# Patient Record
Sex: MALE | Race: WHITE | NOT HISPANIC OR LATINO | Employment: OTHER | URBAN - METROPOLITAN AREA
[De-identification: names, ages, dates, MRNs, and addresses within clinical notes are randomized per-mention and may not be internally consistent; named-entity substitution may affect disease eponyms.]

---

## 2017-03-29 ENCOUNTER — TRANSCRIBE ORDERS (OUTPATIENT)
Dept: MRI IMAGING | Facility: CLINIC | Age: 77
End: 2017-03-29

## 2017-03-29 ENCOUNTER — HOSPITAL ENCOUNTER (OUTPATIENT)
Dept: RADIOLOGY | Facility: CLINIC | Age: 77
Discharge: HOME | End: 2017-03-29
Payer: COMMERCIAL

## 2017-03-29 DIAGNOSIS — R91.8 LUNG MASS: ICD-10-CM

## 2017-03-29 DIAGNOSIS — R91.8 LUNG MASS: Primary | ICD-10-CM

## 2017-03-29 PROCEDURE — 71020 HB CHEST X-RAY 2VW FRONTAL&LATL: CPT

## 2017-05-08 ENCOUNTER — ALLSCRIPTS OFFICE VISIT (OUTPATIENT)
Dept: OTHER | Facility: OTHER | Age: 77
End: 2017-05-08

## 2017-05-08 ENCOUNTER — TRANSCRIBE ORDERS (OUTPATIENT)
Dept: ADMINISTRATIVE | Facility: HOSPITAL | Age: 77
End: 2017-05-08

## 2017-05-08 DIAGNOSIS — E13.42 OTHER SPECIFIED DIABETES MELLITUS WITH DIABETIC POLYNEUROPATHY (HCC): ICD-10-CM

## 2017-05-08 DIAGNOSIS — G45.9 TRANSIENT CEREBRAL ISCHEMIC ATTACK: ICD-10-CM

## 2017-05-08 DIAGNOSIS — G45.9 UNSPECIFIED TRANSIENT CEREBRAL ISCHEMIA: Primary | ICD-10-CM

## 2017-05-22 ENCOUNTER — HOSPITAL ENCOUNTER (OUTPATIENT)
Dept: MRI IMAGING | Facility: CLINIC | Age: 77
Discharge: HOME | End: 2017-05-22
Payer: COMMERCIAL

## 2017-05-22 ENCOUNTER — HOSPITAL ENCOUNTER (OUTPATIENT)
Dept: ULTRASOUND IMAGING | Facility: CLINIC | Age: 77
Discharge: HOME | End: 2017-05-22
Payer: COMMERCIAL

## 2017-05-22 DIAGNOSIS — G45.9 TRANSIENT CEREBRAL ISCHEMIC ATTACK: ICD-10-CM

## 2017-05-22 PROCEDURE — 93880 EXTRACRANIAL BILAT STUDY: CPT

## 2017-05-22 PROCEDURE — 70551 MRI BRAIN STEM W/O DYE: CPT

## 2017-05-31 ENCOUNTER — TRANSCRIBE ORDERS (OUTPATIENT)
Dept: MRI IMAGING | Facility: CLINIC | Age: 77
End: 2017-05-31

## 2017-05-31 DIAGNOSIS — R91.1 COIN LESION: Primary | ICD-10-CM

## 2017-06-01 ENCOUNTER — GENERIC CONVERSION - ENCOUNTER (OUTPATIENT)
Dept: OTHER | Facility: OTHER | Age: 77
End: 2017-06-01

## 2017-06-09 ENCOUNTER — HOSPITAL ENCOUNTER (OUTPATIENT)
Dept: CT IMAGING | Facility: CLINIC | Age: 77
Discharge: HOME | End: 2017-06-09
Payer: COMMERCIAL

## 2017-06-09 DIAGNOSIS — R91.1 COIN LESION: ICD-10-CM

## 2017-06-09 PROCEDURE — 71250 CT THORAX DX C-: CPT

## 2017-06-22 ENCOUNTER — ALLSCRIPTS OFFICE VISIT (OUTPATIENT)
Dept: OTHER | Facility: OTHER | Age: 77
End: 2017-06-22

## 2017-08-01 ENCOUNTER — ALLSCRIPTS OFFICE VISIT (OUTPATIENT)
Dept: OTHER | Facility: OTHER | Age: 77
End: 2017-08-01

## 2017-08-01 ENCOUNTER — HOSPITAL ENCOUNTER (OUTPATIENT)
Dept: MRI IMAGING | Facility: CLINIC | Age: 77
Discharge: HOME | End: 2017-08-01
Payer: COMMERCIAL

## 2017-08-01 DIAGNOSIS — F05 DELIRIUM DUE TO KNOWN PHYSIOLOGICAL CONDITION: ICD-10-CM

## 2017-08-01 PROCEDURE — 70551 MRI BRAIN STEM W/O DYE: CPT

## 2017-08-02 ENCOUNTER — TRANSCRIBE ORDERS (OUTPATIENT)
Dept: ADMINISTRATIVE | Facility: HOSPITAL | Age: 77
End: 2017-08-02

## 2017-08-02 DIAGNOSIS — F05 DELIRIUM DUE TO CONDITIONS CLASSIFIED ELSEWHERE: Primary | ICD-10-CM

## 2017-08-24 ENCOUNTER — ALLSCRIPTS OFFICE VISIT (OUTPATIENT)
Dept: OTHER | Facility: OTHER | Age: 77
End: 2017-08-24

## 2017-10-24 NOTE — PROGRESS NOTES
Assessment  1  Cerebrovascular accident (CVA) due to occlusion of left middle cerebral artery (434 91)   (T44 030)   2  Polyneuropathy due to secondary diabetes (249 60,357 2) (E13 42)   3  Memory difficulty (780 93) (R41 3)    Plan  Cerebrovascular accident (CVA) due to occlusion of left middle cerebral artery, Memory  difficulty, Polyneuropathy due to secondary diabetes    · Follow-up visit in 2 months Evaluation and Treatment  Follow-up  Status: Hold For -  Scheduling  Requested for: 12Fcc2630   Ordered; For: Cerebrovascular accident (CVA) due to occlusion of left middle cerebral artery, Memory difficulty, Polyneuropathy due to secondary diabetes; Ordered By: Jhon Lin Performed:  Due: 18EQV9051  Memory difficulty    · Donepezil HCl - 10 MG Oral Tablet (Aricept); TAKE 1 TABLET AT BEDTIME   Rx By: Jhon Lin; Dispense: 0 Days ; #:30 Tablet; Refill: 5;For: Memory difficulty; ALEXY = N; Sent To: [facility]   · Donepezil HCl - 5 MG Oral Tablet (Aricept); TAKE 1 TABLET AT BEDTIME   Rx By: Jhon Lin; Dispense: 28 Days ; #:28 Tablet; Refill: 0;For: Memory difficulty; ALEXY = N; Sent To: [facility]    Discussion/Summary  Discussion Summary:   Herbie Cranker suffered an acute thalamic CVA  He had had a stroke earlier this summer felt secondary to problems controlling his Coumadin and Eliquis was substituted but at that time his aspirin was discontinued  He will continue with both Eliquis and aspirin  He is having more cognitive difficulties which may be related to multi-infarct dementia  Have recommended initiating Aricept 5 mg daily for 4 weeks then increase to 10 mg  We discussed the potential adverse effects and they will notify me if he has any problem  Chief Complaint  Chief Complaint Free Text Note Form: Patient returns in follow up for Acute confusional state  Patient states no EEG was done because an MRI of brain was ordered        History of Present Illness  HPI: Herbie Cranker returns in follow-up today  In my absence earlier this month he was seen by my associate Dr Faiza Arias following a confusional episode that occurred the day before  An MRI of the brain was performed which demonstrated acute left thalamic stroke  The next day he was admitted to [address] [place] [address] because of the stroke  He had a carotid ultrasound performed which failed to demonstrate any significant abnormalities and he was released on eloquent was for his atrial fibrillation as well as an 81 mg aspirin  He has been taking these medications since discharge and tolerates them well  He still notes a little bit of weakness on the left side and notes fatigue since the stroke  His wife reports that he seems to be a bit more forgetful  He forgets things that happened in the recent past but is able to remember things from long ago  He gets confused with his TV remote which she used to be able to operate without difficulty  Review of Systems  Neurological ROS:   Constitutional: no fever, no chills, no recent weight gain, no recent weight loss, no complaints of feeling tired, no changes in appetite  HEENT: sinus problems-- and-- feeling congested  Cardiovascular:  no chest pain or pressure, no palpitations present, the heart rate was not rapid or irregular, no swelling in the arms or legs, no poor circulation  Respiratory:  no unusual or persistant cough, no shortness of breath with or without exertion  Gastrointestinal:  no nausea, no vomiting, no diarrhea, no abdominal pain, no changes in bowel habits, no melena, no loss of bowel control  Genitourinary: incontinence  Musculoskeletal:  no arthralgias, no myalgias, no immobility or loss of function, no head/neck/back pain, no pain while walking  Integumentary  no masses, no rash, no skin lesions, no livedo reticularis  Psychiatric: depression  Endocrine   no unusual weight loss or gain, no excessive urination, no excessive thirst, no hair loss or gain, no hot or cold intolerance, no menstrual period change or irregularity, no loss of sexual ability or drive, no erection difficulty, no nipple discharge  Hematologic/Lymphatic:  no unusual bleeding, no tendency for easy bruising, no clotting skin or lumps  Neurological General:  no headache, no nausea or vomiting, no lightheadedness, no convulsions, no blackouts, no syncope, no trauma, no photopsia, no increased sleepiness, no trouble falling asleep, no snoring, no awakening at night  Neurological Mental Status: difficulty expressing/understanding speech-- and-- memory problems  Neurological Cranial Nerves:  no blurry or double vision, no loss of vision, no face drooping, no facial numbness or weakness, no taste or smell loss/changes, no hearing loss or ringing, no vertigo or dizziness, no dysphagia, no slurred speech  Neurological Motor findings include:  no tremor, no twitching, no cramping(pre/post exercise), no atrophy  Neurological Coordination: unsteadiness  Neurological Sensory:  no numbness, no pain, no tingling, does not fall when eyes closed or taking a shower  Neurological Gait:  no difficulty walking, not falling to one side, no sensation of being pushed, has not had falls  ROS Reviewed:   ROS reviewed  Active Problems  1  Acute confusional state (293 0) (F05)   2  Asymptomatic carotid artery stenosis (433 10) (I65 29)   3  Benign essential hypertension (401 1) (I10)   4  Benign prostatic hypertrophy (600 00) (N40 0)   5  Cerebrovascular accident (CVA) due to embolism of right posterior cerebral artery   (434 11) (I63 431)   6  Cerebrovascular accident (CVA) due to occlusion of left middle cerebral artery (434 91)   (I63 512)   7  Diabetes Mellitus (250 00)   8  Glaucoma (increased eye pressure) (365 9) (H40 9)   9  History of myocardial infarction (412) (I25 2)   10  Hyperlipidemia (272 4) (E78 5)   11  Inflamed seborrheic keratosis (702 11) (L82 0)   12   Polyneuropathy due to secondary diabetes (249 60,357 2) (E13 42)   13  Screening for skin condition (V82 0) (Z13 89)   14  Seborrheic dermatitis (690 10) (L21 9)   15  Seborrheic keratosis (702 19) (L82 1)   16  TIA (transient ischemic attack) (435 9) (G45 9)   17  Vitamin B12 deficiency (266 2) (E53 8)    Past Medical History  1  History of transient cerebral ischemia (V12 54) (A02 52)  Active Problems And Past Medical History Reviewed: The active problems and past medical history were reviewed and updated today  Surgical History  1  History of Carotid Thromboendarterectomy   2  History of Prostate Surgery  Surgical History Reviewed: The surgical history was reviewed and updated today  Family History  Mother    1  No pertinent family history  Son    2  Family history of diabetes mellitus (V18 0) (Z83 3)  Family History Reviewed: The family history was reviewed and updated today  Social History   · Former smoker (E78 77) (R78 439)   ·    · No alcohol use   · No drug use   · Retired  Social History Reviewed: The social history was reviewed and updated today  Current Meds   1  Aspirin EC 81 MG Oral Tablet Delayed Release; TAKE 1 TABLET DAILY; Therapy: 01Aug2017 to (Evaluate:30Oct2017); Last Rx:01Aug2017 Ordered   2  Atorvastatin Calcium 20 MG Oral Tablet; TAKE 1 TABLET DAILY; Therapy: (Ana Laura Sullivan) to Recorded   3  CoQ-10 100 MG Oral Capsule; take 1 capsule daily; Therapy: (Ana Laura Sullivan) to Recorded   4  Cyanocobalamin 100 MCG/ML SOLN; once every month; Therapy: (Ana Laura Sullivan) to Recorded   5  Digoxin 125 MCG Oral Tablet; TAKE 1 TABLET DAILY; Therapy: (Ana Laura Sullivan) to Recorded   6  Doxazosin Mesylate 4 MG Oral Tablet; Take 1 tablet twice daily; Therapy: (Ana Laura Sullivan) to Recorded   7  Eliquis 5 MG Oral Tablet; Take 1 tablet twice daily; Therapy: (Ana Laura Sullivan) to Recorded   8  Gabapentin 300 MG Oral Capsule; take 1 capsule daily;    Therapy: (Ana Laura Sullivan) to Recorded   9  Gemfibrozil 600 MG Oral Tablet; TAKE 1 TABLET TWICE DAILY; Therapy: (Cayla Linder) to Recorded   10  GlipiZIDE XL 2 5 MG Oral Tablet Extended Release 24 Hour; TAKE 2 TABLET Every    morning 1 tab in pm;    Therapy: (Recorded:22Jun2017) to Recorded   11  Isosorbide Mononitrate 30 MG TB24; TAKE 1 TABLET DAILY; Therapy: (Cayla Linder) to Recorded   12  Ketoconazole 2 % External Shampoo; SHAMPOO HAIR/SCALP TWICE WEEKLY; Therapy: [id number] to (Last Rx:08Auo9559)  Requested for: 69EVF1241 Ordered   13  Lisinopril 40 MG Oral Tablet; TAKE 1 TABLET DAILY; Therapy: (Cayla Linder) to Recorded   14  Matzim  MG Oral Tablet Extended Release 24 Hour; TAKE 1 TABLET DAILY; Therapy: (Cayla Linder) to Recorded   15  MetFORMIN HCl - 500 MG Oral Tablet; TAKE 1 TABLET TWICE DAILY; Therapy: (Cayla Linder) to Recorded   16  Metoprolol Succinate ER 50 MG Oral Tablet Extended Release 24 Hour; TAKE TABLET  1    tab twice as directed; Therapy: (Cayla Linder) to Recorded   17  MiraLax Oral Packet; TAKE 1 PACKET Daily PRN; Therapy: (Dona Min) to Recorded   18  Timolol Maleate 0 5 % Ophthalmic Solution; APPLY 1 DROP Every morning both eyes; Therapy: (Cayla Linder) to Recorded  Medication List Reviewed: The medication list was reviewed and updated today  Allergies  1  No Known Drug Allergies  2  No Known Environmental Allergies   3  No Known Food Allergies    Vitals  Signs   Recorded: 28Nmn1016 12:47PM   Heart Rate: 48  Systolic: 447, LUE, Sitting  Diastolic: 62, LUE, Sitting  Height: 5 ft 6 in  Weight: 138 lb 2 oz  BMI Calculated: 22 29  BSA Calculated: 1 71  Pain Scale: 0    Physical Exam    Constitutional   General appearance: No acute distress, well appearing and well nourished  HEENT/NECK: Head is atraumatic normocephalic, neck is supple  NEUROLOGIC:  Mental Status: Awake and alert without aphasia   He is oriented 4/5 (could not tell me the correct date)  Was able to recall 3 of 3 objects immediately and 0 of 3 objects after a few minutes  No evidence of aphasia, mild dysarthria is noted  Cranial Nerves: Extraocular movements are full  Face is symmetrical to light touch and movements  Motor: No drift is noted on arm extension  Coordination: Finger to nose testing is performed accurately  Romberg reveals increased sway with eyes closed  Gait reveals a mild increased base with decreased arm swing right greater than left    Reflexes: Hypoactive throughout      Results/Data  Results of MRI performed here as well as CAT scan and carotid ultrasound as well as [facility] discharge summary were reviewed        Future Appointments    Date/Time Provider Specialty Site   12/26/2017 12:40 PM Valentino Silos, MD Neurology [facility] [address]   Electronically signed by : Rodriguez Cid MD; Aug 24 2017  1:25PM EST                       (Author)

## 2017-11-22 DIAGNOSIS — I65.29 OCCLUSION AND STENOSIS OF UNSPECIFIED CAROTID ARTERY: ICD-10-CM

## 2017-11-22 DIAGNOSIS — I63.512 CEREBRAL INFARCTION DUE TO UNSPECIFIED OCCLUSION OR STENOSIS OF LEFT MIDDLE CEREBRAL ARTERY (HCC): ICD-10-CM

## 2017-12-26 ENCOUNTER — ALLSCRIPTS OFFICE VISIT (OUTPATIENT)
Dept: OTHER | Facility: OTHER | Age: 77
End: 2017-12-26

## 2017-12-27 NOTE — PROGRESS NOTES
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 (249 60,357 2) (E13 42)   3  Memory difficulty (780 93) (R41 3)    Plan   Cerebrovascular accident (CVA) due to occlusion of left middle cerebral artery, Memory    difficulty, Polyneuropathy due to secondary diabetes    · Follow-up visit in 2 months Evaluation and Treatment  Follow-up  Status: Hold For -    Scheduling  Requested for: 18ZTD8811   Ordered; For: Cerebrovascular accident (CVA) due to occlusion of left middle cerebral artery, Memory difficulty, Polyneuropathy due to secondary diabetes; Ordered By: Sofie Crespo Performed:  Due: 34QEX0704    Discussion/Summary   Discussion Summary:    He will restart the Aricept taking it in the morning  If he has no problems with this he will increase the dose to 10 mg  If he does have issues he will notify me  Chief Complaint   Chief Complaint Free Text Note Form: Patient present for CVA, Polyneuropathy, and memory difficulty  History of Present Illness   HPI: Valerie Bone reports that he took the Aricept for about a week and he felt that it was keeping him up at night so he stopped taking it  He did not notify me  He denied any vivid dreams or other adverse effects from the medicine  His wife reports that she does not feel that his memory has gotten worse since here last  He denies any new symptoms of stroke and denies any symptoms of neuropathic pain in his feet      Review of Systems   Neurological ROS:      Constitutional: no fever, no chills, no recent weight gain, no recent weight loss, no complaints of feeling tired, no changes in appetite  HEENT:  no sinus problems, not feeling congested, no blurred vision, no dryness of the eyes, no eye pain, no hearing loss, no tinnitus, no mouth sores, no sore throat, no hoarseness, no dysphagia, no masses, no bleeding        Cardiovascular:  no chest pain or pressure, no palpitations present, the heart rate was not rapid or irregular, no swelling in the arms or legs, no poor circulation  Respiratory:  no unusual or persistant cough, no shortness of breath with or without exertion  Gastrointestinal:  no nausea, no vomiting, no diarrhea, no abdominal pain, no changes in bowel habits, no melena, no loss of bowel control  Genitourinary:  no incontinence, no feelings of urinary urgency, no increase in frequency, no urinary hesitancy, no dysuria, no hematuria  Musculoskeletal: arthralgias  Integumentary  no masses, no rash, no skin lesions, no livedo reticularis  Psychiatric: mood swings  Endocrine loss of sexual ability or drive   Hematologic/Lymphatic:  no unusual bleeding, no tendency for easy bruising, no clotting skin or lumps  Neurological General: trouble falling asleep-- and-- waking up at night  Neurological Mental Status: memory problems  Neurological Cranial Nerves:  no blurry or double vision, no loss of vision, no face drooping, no facial numbness or weakness, no taste or smell loss/changes, no hearing loss or ringing, no vertigo or dizziness, no dysphagia, no slurred speech  Neurological Motor findings include:  no tremor, no twitching, no cramping(pre/post exercise), no atrophy  Neurological Coordination: balance difficulties  Neurological Sensory:  no numbness, no pain, no tingling, does not fall when eyes closed or taking a shower  Neurological Gait: difficulty walking-- and-- has had falls  ROS Reviewed:    ROS reviewed  Active Problems   1  Acute confusional state (293 0) (F05)   2  Asymptomatic carotid artery stenosis (433 10) (I65 29)   3  Benign essential hypertension (401 1) (I10)   4  Benign prostatic hypertrophy (600 00) (N40 0)   5  Cerebrovascular accident (CVA) due to embolism of right posterior cerebral artery     (434 11) (I63 431)   6   Cerebrovascular accident (CVA) due to occlusion of left middle cerebral artery (434 91)     (I63 512)   7  Diabetes Mellitus (250 00)   8  Glaucoma (increased eye pressure) (365 9) (H40 9)   9  History of myocardial infarction (412) (I25 2)   10  Hyperlipidemia (272 4) (E78 5)   11  Inflamed seborrheic keratosis (702 11) (L82 0)   12  Memory difficulty (780 93) (R41 3)   13  Polyneuropathy due to secondary diabetes (249 60,357 2) (E13 42)   14  Screening for skin condition (V82 0) (Z13 89)   15  Seborrheic dermatitis (690 10) (L21 9)   16  Seborrheic keratosis (702 19) (L82 1)   17  TIA (transient ischemic attack) (435 9) (G45 9)   18  Vitamin B12 deficiency (266 2) (E53 8)    Past Medical History   1  History of transient cerebral ischemia (V12 54) (T77 18)  Active Problems And Past Medical History Reviewed: The active problems and past medical history were reviewed and updated today  Surgical History   1  History of Carotid Thromboendarterectomy   2  History of Iridectomy Sector, For Glaucoma   3  History of Prostate Surgery  Surgical History Reviewed: The surgical history was reviewed and updated today  Family History   Mother    1  No pertinent family history  Son    2  Family history of diabetes mellitus (V18 0) (Z83 3)  Family History Reviewed: The family history was reviewed and updated today  Social History    · Former smoker (J26 83) (G34 798)   ·    · No alcohol use   · No drug use   · Retired  Social History Reviewed: The social history was reviewed and updated today  Current Meds    1  Aspirin EC 81 MG Oral Tablet Delayed Release; TAKE 1 TABLET DAILY; Therapy: 01Aug2017 to (Evaluate:30Oct2017); Last Rx:01Aug2017 Ordered   2  Atorvastatin Calcium 20 MG Oral Tablet; TAKE 1 TABLET DAILY; Therapy: (Betina Luciano) to Recorded   3  CoQ-10 100 MG Oral Capsule; take 1 capsule daily; Therapy: (Betina Luciano) to Recorded   4   Cyanocobalamin 100 MCG/ML SOLN; once every month; Therapy: (Fang Lopez) to Recorded   5  Digoxin 125 MCG Oral Tablet; TAKE 1 TABLET DAILY; Therapy: (Fang Lopez) to Recorded   6  Donepezil HCl - 5 MG Oral Tablet; TAKE 1 TABLET AT BEDTIME; Therapy: 35Wot0898 to (Evaluate:80Tsd6070)  Requested for: 00Fhn0361; Last     Rx:09Gry4052 Ordered   7  Doxazosin Mesylate 4 MG Oral Tablet; Take 1 tablet twice daily; Therapy: (Fang Lopez) to Recorded   8  Eliquis 5 MG Oral Tablet; Take 1 tablet twice daily; Therapy: (Fang Lopez) to Recorded   9  Gabapentin 300 MG Oral Capsule; take 1 capsule daily; Therapy: (Fang Lopez) to Recorded   10  Gemfibrozil 600 MG Oral Tablet; TAKE 1 TABLET TWICE DAILY; Therapy: (Fang Lopez) to Recorded   11  GlipiZIDE XL 2 5 MG Oral Tablet Extended Release 24 Hour; TAKE 2 TABLET Every      morning 1 tab in pm;      Therapy: (Recorded:22Jun2017) to Recorded   12  Isosorbide Mononitrate 30 MG TB24; TAKE 1 TABLET DAILY; Therapy: (Fang Lopez) to Recorded   13  Ketoconazole 2 % External Shampoo; SHAMPOO HAIR AND SCALP TWICE WEEKLY; Therapy: 65OVQ4459 to (Evaluate:13Mar2018)  Requested for: 35AZI3128; Last      Rx:13Nov2017 Ordered   14  Lisinopril 40 MG Oral Tablet; TAKE 1 TABLET DAILY; Therapy: (Fang Lopez) to Recorded   15  Matzim  MG Oral Tablet Extended Release 24 Hour; TAKE 1 TABLET DAILY; Therapy: (Fang Lopez) to Recorded   16  MetFORMIN HCl - 500 MG Oral Tablet; TAKE 1 TABLET TWICE DAILY; Therapy: (Fang Lopez) to Recorded   17  Metoprolol Succinate ER 50 MG Oral Tablet Extended Release 24 Hour; TAKE TABLET  1      tab twice as directed; Therapy: (Fang Lopez) to Recorded   18  MiraLax Oral Packet; TAKE 1 PACKET Daily PRN; Therapy: (Luis E Adam) to Recorded   19  Timolol Maleate 0 5 % Ophthalmic Solution; APPLY 1 DROP Every morning both eyes;       Therapy: (Fang Lopez) to Recorded  Medication List Reviewed: The medication list was reviewed and updated today  Allergies   1  No Known Drug Allergies  2  No Known Environmental Allergies   3  No Known Food Allergies    Vitals   Signs   Recorded: 59MFC6697 12:01PM   Heart Rate: 62  Systolic: 680  Diastolic: 76  Height: 5 ft 6 in  Weight: 149 lb 8 oz  BMI Calculated: 24 13  BSA Calculated: 1 77    Physical Exam        Constitutional      General appearance: No acute distress, well appearing and well nourished  HEENT/NECK: Head is atraumatic normocephalic, neck is supple     NEUROLOGIC:     Mental Status: Awake and alert without aphasia  Under a got frustrated doing the [facility] in it was aborted after about 2/3 was done  He had already missed 8 points on the part that was completed     Cranial Nerves: Extraocular movements are full  Face is symmetrical     Motor:  No drift is noted on arm extension     Coordination:  Finger-to-nose testing is performed accurately    Gait reveals an increased base, stable with a straight cane          Signatures    Electronically signed by : Mariano Zeng MD; Dec 26 2017 12:40PM EST                       (Author)

## 2018-01-09 NOTE — MISCELLANEOUS
Message   Recorded as Task   Date: 05/10/2016 10:16 AM, Created By: Sophia Alarcon   Task Name: Care Coordination   Assigned To: Valentino Silos   Regarding Patient: True Mcelroy, Status: Active   Comment:    Krysta Hernandes - 10 May 2016 10:16 AM     TASK CREATED  Caller: Mart Tafoya from Dr [facility] office wants you to evaluate Jarvis Field to inform him of any neurological risks of a TURP surgery and a Cystolithalopaxy which are scheduled for 5/24 with Dr Betsy Valdez  I tried to explain to her that we do not do surgical clearance appts and that Jarvis Hidalgoyaya was just in to see you for an appt on 5/6  She insists that Dr Betsy Valdez wants you to see Jarvis Field Again to explain risks  Please nazia Bullock can be reached at [phone] if needed   Valentino Silos - 11 May 2016 2:34 PM     TASK EDITED  I spoke with Dr Betsy Valdez personally this afternoon discussed Genette Gone case and the fact that I saw him last week  He is not on Coumadin for stroke prophylaxis per se, but has been taking this since his heart attack  His stroke was secondary to severe carotid stenosis and he is status post carotid endarterectomy   His recent carotid ultrasounds do not demonstrate significant carotid stenosis and he does understand increased risks of coming off of his medication        Signatures   Electronically signed by : Rodriguez Cid MD; May 11 2016  2:34PM EST                       (Author)

## 2018-01-11 NOTE — MISCELLANEOUS
Message   Recorded as Task   Date: 05/31/2017 02:24 PM, Created By: Lorna Dubois   Task Name: Care Coordination   Assigned To: Hitesh Goodson   Regarding Patient: Leonel Sofia, Status: Active   Comment:    Lorna Dubois - 31 May 2017 2:24 PM     TASK CREATED  Pt stopped in wants to know if gabby will call him about the MRI he had done  Hitesh Goodson - 08 May 2017 4:15 PM     TASK EDITED  Called patient with results  I will review them with the radiologist tomorrow   Hitesh Goodson - 01 Jun 2017 5:02 PM     TASK EDITED  I tried to call Kaylee First twice today regarding his MRI results  I will try again tomorrow   Hitesh Goodson - 02 Jun 2017 12:43 PM     TASK EDITED  Spoke with Kaylee First and explained that the recent MRI did show 2 subacute strokes on the right side that might have explained that his left-sided weakness  He was at his cardiologist yesterday and was started on Eliquis in place of Coumadin for more consistent anticoagulation   If he has new symptoms he will notify me        Signatures   Electronically signed by : Roseann Wolf MD; Jun 2 2017 12:43PM EST                       (Author)

## 2018-01-13 VITALS
HEIGHT: 66 IN | SYSTOLIC BLOOD PRESSURE: 136 MMHG | DIASTOLIC BLOOD PRESSURE: 62 MMHG | BODY MASS INDEX: 22.2 KG/M2 | WEIGHT: 138.13 LBS | HEART RATE: 48 BPM

## 2018-01-13 VITALS
RESPIRATION RATE: 16 BRPM | BODY MASS INDEX: 20.31 KG/M2 | HEART RATE: 62 BPM | SYSTOLIC BLOOD PRESSURE: 152 MMHG | WEIGHT: 134 LBS | DIASTOLIC BLOOD PRESSURE: 74 MMHG | HEIGHT: 68 IN

## 2018-01-14 VITALS
RESPIRATION RATE: 16 BRPM | SYSTOLIC BLOOD PRESSURE: 179 MMHG | HEART RATE: 64 BPM | DIASTOLIC BLOOD PRESSURE: 80 MMHG | BODY MASS INDEX: 21.07 KG/M2 | WEIGHT: 139 LBS | HEIGHT: 68 IN

## 2018-01-14 VITALS
HEIGHT: 66 IN | HEART RATE: 61 BPM | SYSTOLIC BLOOD PRESSURE: 128 MMHG | BODY MASS INDEX: 21.38 KG/M2 | DIASTOLIC BLOOD PRESSURE: 68 MMHG | WEIGHT: 133 LBS

## 2018-01-23 VITALS
DIASTOLIC BLOOD PRESSURE: 76 MMHG | SYSTOLIC BLOOD PRESSURE: 148 MMHG | HEIGHT: 66 IN | HEART RATE: 62 BPM | WEIGHT: 149.5 LBS | BODY MASS INDEX: 24.03 KG/M2

## 2018-03-28 RX ORDER — GLIPIZIDE 2.5 MG/1
2.5 TABLET, EXTENDED RELEASE ORAL DAILY
COMMUNITY

## 2018-03-28 RX ORDER — ONDANSETRON 4 MG/1
TABLET, FILM COATED ORAL
Refills: 0 | COMMUNITY
Start: 2018-01-18 | End: 2019-04-11 | Stop reason: SDDI

## 2018-03-28 RX ORDER — DONEPEZIL HYDROCHLORIDE 5 MG/1
10 TABLET, FILM COATED ORAL
COMMUNITY
Start: 2017-08-24 | End: 2018-10-02

## 2018-03-28 RX ORDER — ISOSORBIDE MONONITRATE 30 MG/1
1 TABLET, EXTENDED RELEASE ORAL DAILY
COMMUNITY

## 2018-03-28 RX ORDER — DILTIAZEM HYDROCHLORIDE EXTENDED-RELEASE TABLETS 360 MG/1
1 TABLET, EXTENDED RELEASE ORAL DAILY
COMMUNITY
End: 2018-10-02 | Stop reason: ALTCHOICE

## 2018-03-28 RX ORDER — LISINOPRIL 40 MG/1
1 TABLET ORAL DAILY
COMMUNITY
End: 2018-10-02 | Stop reason: ALTCHOICE

## 2018-03-28 RX ORDER — DONEPEZIL HYDROCHLORIDE 10 MG/1
1 TABLET, FILM COATED ORAL
COMMUNITY
Start: 2017-08-24 | End: 2018-06-20 | Stop reason: SDUPTHER

## 2018-03-28 RX ORDER — METOPROLOL SUCCINATE 50 MG/1
TABLET, EXTENDED RELEASE ORAL
COMMUNITY
End: 2018-10-02 | Stop reason: ALTCHOICE

## 2018-03-28 RX ORDER — KETOCONAZOLE 20 MG/ML
SHAMPOO TOPICAL
COMMUNITY
Start: 2016-02-16 | End: 2018-06-11 | Stop reason: SDUPTHER

## 2018-03-28 RX ORDER — GABAPENTIN 600 MG/1
600 TABLET ORAL 2 TIMES DAILY
COMMUNITY

## 2018-03-28 RX ORDER — ATORVASTATIN CALCIUM 20 MG/1
1 TABLET, FILM COATED ORAL DAILY
COMMUNITY

## 2018-03-28 RX ORDER — DOXAZOSIN MESYLATE 4 MG/1
1 TABLET ORAL 2 TIMES DAILY
COMMUNITY

## 2018-03-28 RX ORDER — POLYETHYLENE GLYCOL 3350 17 G/17G
POWDER, FOR SOLUTION ORAL DAILY
COMMUNITY

## 2018-03-28 RX ORDER — DIGOXIN 125 MCG
1 TABLET ORAL DAILY
COMMUNITY

## 2018-03-28 RX ORDER — CYANOCOBALAMIN 1000 UG/ML
INJECTION INTRAMUSCULAR; SUBCUTANEOUS
COMMUNITY

## 2018-03-28 RX ORDER — GEMFIBROZIL 600 MG/1
1 TABLET, FILM COATED ORAL 2 TIMES DAILY
COMMUNITY

## 2018-03-28 RX ORDER — ASPIRIN 81 MG/1
1 TABLET ORAL DAILY
COMMUNITY
Start: 2017-08-01

## 2018-03-28 RX ORDER — TIMOLOL MALEATE 6.8 MG/ML
SOLUTION/ DROPS OPHTHALMIC
COMMUNITY
End: 2018-10-02 | Stop reason: SDDI

## 2018-03-30 ENCOUNTER — OFFICE VISIT (OUTPATIENT)
Dept: NEUROLOGY | Facility: CLINIC | Age: 78
End: 2018-03-30
Payer: COMMERCIAL

## 2018-03-30 VITALS
SYSTOLIC BLOOD PRESSURE: 146 MMHG | BODY MASS INDEX: 23.01 KG/M2 | DIASTOLIC BLOOD PRESSURE: 66 MMHG | HEART RATE: 56 BPM | RESPIRATION RATE: 16 BRPM | HEIGHT: 68 IN | WEIGHT: 151.8 LBS

## 2018-03-30 DIAGNOSIS — I63.542 CEREBROVASCULAR ACCIDENT (CVA) DUE TO OCCLUSION OF LEFT CEREBELLAR ARTERY (HCC): Primary | ICD-10-CM

## 2018-03-30 DIAGNOSIS — G31.84 MILD COGNITIVE IMPAIRMENT: ICD-10-CM

## 2018-03-30 DIAGNOSIS — E11.42 DIABETIC POLYNEUROPATHY ASSOCIATED WITH TYPE 2 DIABETES MELLITUS (HCC): ICD-10-CM

## 2018-03-30 PROCEDURE — 99213 OFFICE O/P EST LOW 20 MIN: CPT | Performed by: PSYCHIATRY & NEUROLOGY

## 2018-03-30 RX ORDER — AMLODIPINE BESYLATE 10 MG/1
10 TABLET ORAL DAILY
COMMUNITY
Start: 2018-03-17

## 2018-03-30 RX ORDER — CARVEDILOL 25 MG/1
25 TABLET ORAL 2 TIMES DAILY WITH MEALS
COMMUNITY
Start: 2018-03-08

## 2018-03-30 RX ORDER — LOSARTAN POTASSIUM 100 MG/1
100 TABLET ORAL DAILY
COMMUNITY
Start: 2018-03-08

## 2018-03-30 NOTE — PROGRESS NOTES
Josefa Rosales is a 65 y o  male With history of memory difficulty, CVA and diabetic peripheral neuropathy    Assessment:  1  Cerebrovascular accident (CVA) due to occlusion of left cerebellar artery ([facility] [address] )    2  Mild cognitive impairment    3  Diabetic polyneuropathy associated with type 2 diabetes mellitus ([address] )        Plan:   continue current medications  Follow-up 6 months    Discussion:   Franny Montes symptoms of aphasia and right-sided weakness have improved following his stroke  He will continue with current medications  Cognitively he has been fairly stable and continue with the donepezil  Neuropathic pain symptoms remain under good control and I will see him back in follow-up in 6 months      Subjective:    HPI   Franny Montes reports overall he has been doing well  Denies any new symptoms of stroke  He did have some symptoms of dizziness related to a mistake in his medications  He was admitted to [facility] in his medications were modified and is doing well  He and his wife feel that his memory is doing well and is tolerating the Aricept without adverse effect taking in the morning  He denies any symptoms of neuropathic pain with the gabapentin      Past Medical History:   Diagnosis Date    Asymptomatic carotid artery stenosis     CVA [facility]cerebral vascular accident) ([facility] [address] [facility]     Diabetes ([facility] [address] )     Diabetic polyneuropathy ([facility] )     Glaucoma     Heart attack     Hyperlipidemia     Hypertension     Hypertrophy of prostate     Memory difficulties     Seborrheic dermatitis     Seborrheic keratosis     TIA (transient ischemic attack)     Vitamin B12 deficiency        Family History:  Family History   Problem Relation Age of Onset    No Known Problems Mother     Diabetes Son        Past Surgical History:  Past Surgical History:   Procedure Laterality Date    IRIDECTOMY      PROSTATE SURGERY      THROMBOENDARTERECTOMY         Social History:   reports that he has quit smoking   He has never used smokeless tobacco  He reports that he does not drink alcohol or use drugs  Allergies:  Patient has no known allergies        Current Outpatient Prescriptions:     amLODIPine (NORVASC) 10 mg tablet, , Disp: , Rfl:     apixaban (ELIQUIS) 5 mg, Take 1 tablet by mouth 2 (two) times a day, Disp: , Rfl:     aspirin (ECOTRIN LOW STRENGTH) 81 mg EC tablet, Take 1 tablet by mouth daily, Disp: , Rfl:     carvedilol (COREG) 25 mg tablet, , Disp: , Rfl:     Coenzyme Q10 (COQ-10) 100 MG CAPS, Take 1 capsule by mouth daily, Disp: , Rfl:     cyanocobalamin 1,000 mcg/mL, Inject as directed, Disp: , Rfl:     donepezil (ARICEPT) 10 mg tablet, Take 1 tablet by mouth, Disp: , Rfl:     doxazosin (CARDURA) 4 mg tablet, Take 1 tablet by mouth 2 (two) times a day, Disp: , Rfl:     gabapentin (NEURONTIN) 300 mg capsule, Take 1 capsule by mouth daily, Disp: , Rfl:     gemfibrozil (LOPID) 600 mg tablet, Take 1 tablet by mouth 2 (two) times a day, Disp: , Rfl:     glipiZIDE (GLUCOTROL XL) 2 5 mg 24 hr tablet, Take by mouth, Disp: , Rfl:     isosorbide mononitrate (IMDUR) 30 mg 24 hr tablet, Take 1 tablet by mouth daily, Disp: , Rfl:     ketoconazole (NIZORAL) 2 % shampoo, Apply topically, Disp: , Rfl:     losartan (COZAAR) 100 MG tablet, , Disp: , Rfl:     metFORMIN (GLUCOPHAGE) 500 mg tablet, Take 1 tablet by mouth 2 (two) times a day, Disp: , Rfl:     polyethylene glycol (MIRALAX) 17 g packet, Take by mouth Daily, Disp: , Rfl:     atorvastatin (LIPITOR) 20 mg tablet, Take 1 tablet by mouth daily, Disp: , Rfl:     digoxin (LANOXIN) 0 125 mg tablet, Take 1 tablet by mouth daily, Disp: , Rfl:     diltiazem (MATZIM LA) 360 MG 24 hr tablet, Take 1 tablet by mouth daily, Disp: , Rfl:     donepezil (ARICEPT) 5 mg tablet, Take 1 tablet by mouth, Disp: , Rfl:     lisinopril (ZESTRIL) 40 mg tablet, Take 1 tablet by mouth daily, Disp: , Rfl:     metoprolol succinate (TOPROL-XL) 50 mg 24 hr tablet, Take by mouth, Disp: , Rfl:    ondansetron (ZOFRAN) 4 mg tablet, TAKE 1 TAB BY MOUTH THREE TO FOUR TIMES PER DAY AS NEEDED, Disp: , Rfl: 0    Timolol Maleate 0 5 % (DAILY) SOLN, Apply to eye, Disp: , Rfl:      I have reviewed the past medical, social and family history, current medications, allergies, vitals, review of systems and updated this information as appropriate today      Objective:    Vitals:  Blood pressure 146/66, pulse 56, resp  rate 16, height 5' 8 11" (1 73 m), weight 68 9 kg (151 lb 12 8 oz)  Physical Exam    Neurological Exam   GENERAL:  Well-developed well-nourished man in no acute distress  HEENT/NECK: Head is atraumatic normocephalic, neck is supple  CARDIOVASCULAR:  no  Carotid bruit  NEUROLOGIC:  Mental Status: Awake and alert without aphasia  Cranial Nerves: Extraocular movements are full  Face is symmetrical  Motor:  No drift is noted on arm extension  Coordination:  Finger-to-nose testing is performed accurately  Romberg is negative  Gait is stable with a straight cane  Reflexes:   Hyperreflexia is noted on the right relative to the left            ROS:    Review of Systems   Constitutional: Negative  HENT: Negative  Eyes: Negative  Respiratory: Negative  Cardiovascular: Negative  Gastrointestinal: Negative  Endocrine: Negative  Genitourinary: Negative  Musculoskeletal: Negative  Skin: Negative  Allergic/Immunologic: Negative  Neurological: Negative  Hematological: Negative  Psychiatric/Behavioral: Negative

## 2018-06-11 DIAGNOSIS — L21.9 SEBORRHEIC DERMATITIS: Primary | ICD-10-CM

## 2018-06-11 RX ORDER — KETOCONAZOLE 20 MG/ML
1 SHAMPOO TOPICAL 2 TIMES WEEKLY
Qty: 120 ML | Refills: 2 | Status: SHIPPED | OUTPATIENT
Start: 2018-06-11

## 2018-06-20 DIAGNOSIS — F02.80 ALZHEIMER'S DEMENTIA WITHOUT BEHAVIORAL DISTURBANCE, UNSPECIFIED TIMING OF DEMENTIA ONSET (HCC): Primary | ICD-10-CM

## 2018-06-20 DIAGNOSIS — G30.9 ALZHEIMER'S DEMENTIA WITHOUT BEHAVIORAL DISTURBANCE, UNSPECIFIED TIMING OF DEMENTIA ONSET (HCC): Primary | ICD-10-CM

## 2018-06-21 RX ORDER — DONEPEZIL HYDROCHLORIDE 10 MG/1
TABLET, FILM COATED ORAL
Qty: 30 TABLET | Refills: 5 | Status: SHIPPED | OUTPATIENT
Start: 2018-06-21 | End: 2018-10-02

## 2018-10-02 ENCOUNTER — OFFICE VISIT (OUTPATIENT)
Dept: NEUROLOGY | Facility: CLINIC | Age: 78
End: 2018-10-02
Payer: COMMERCIAL

## 2018-10-02 VITALS
DIASTOLIC BLOOD PRESSURE: 64 MMHG | BODY MASS INDEX: 23.19 KG/M2 | WEIGHT: 153 LBS | SYSTOLIC BLOOD PRESSURE: 160 MMHG | HEIGHT: 68 IN | HEART RATE: 53 BPM

## 2018-10-02 DIAGNOSIS — E13.42 POLYNEUROPATHY DUE TO SECONDARY DIABETES (HCC): ICD-10-CM

## 2018-10-02 DIAGNOSIS — G30.9 ALZHEIMER'S DEMENTIA WITHOUT BEHAVIORAL DISTURBANCE, UNSPECIFIED TIMING OF DEMENTIA ONSET (HCC): ICD-10-CM

## 2018-10-02 DIAGNOSIS — F02.80 ALZHEIMER'S DEMENTIA WITHOUT BEHAVIORAL DISTURBANCE, UNSPECIFIED TIMING OF DEMENTIA ONSET (HCC): ICD-10-CM

## 2018-10-02 DIAGNOSIS — R41.3 MEMORY DIFFICULTY: Primary | ICD-10-CM

## 2018-10-02 DIAGNOSIS — I63.431 CEREBROVASCULAR ACCIDENT (CVA) DUE TO EMBOLISM OF RIGHT POSTERIOR CEREBRAL ARTERY (HCC): ICD-10-CM

## 2018-10-02 DIAGNOSIS — I63.512 CEREBROVASCULAR ACCIDENT (CVA) DUE TO OCCLUSION OF LEFT MIDDLE CEREBRAL ARTERY (HCC): ICD-10-CM

## 2018-10-02 PROCEDURE — 99213 OFFICE O/P EST LOW 20 MIN: CPT | Performed by: PSYCHIATRY & NEUROLOGY

## 2018-10-02 RX ORDER — DOCUSATE SODIUM 100 MG/1
100 CAPSULE, LIQUID FILLED ORAL DAILY PRN
COMMUNITY

## 2018-10-02 NOTE — PROGRESS NOTES
Lori Alegria is a 77 y o  male returns in follow-up with history of memory difficulty, peripheral neuropathy and previous CVAs    Assessment:  1  Memory difficulty    2  Polyneuropathy due to secondary diabetes ([facility] [address] )    3  Cerebrovascular accident (CVA) due to embolism of right posterior cerebral artery ([facility] [address] )    4  Cerebrovascular accident (CVA) due to occlusion of left middle cerebral artery ([facility] [address] )        Plan:  Initiate Namzaric and discontinue Aricept  Carotid ultrasound  Follow-up 6 months    Discussion:  Aniket Barrientos continues to report issues with short-term memory difficulty and feels they may be a bit worse  He does have good days and bad days  Have recommended initiating Namzaric and discontinuing the Aricept as there is Aricept in the medication  His neuropathic pain symptoms are under good control with gabapentin and he has had no further strokes taking aspirin and Eliquis  Will check carotid ultrasound  I will see him back in follow-up in 6 months, sooner if he has more difficulties      Subjective:    HPI  Aniket Barrientos reports that he has good days and bad days  He does have problems with short-term memory  His wife reports that sometimes if he can't remember something he blames her  He has had no new symptoms of stroke since he has been on Eliquis and aspirin  A carotid ultrasound was ordered but was not yet performed  He states he was having more symptoms of neuropathic pain in his feet but his gabapentin dose was increased by his primary physician and he finds this is more effective        Past Medical History:   Diagnosis Date    Asymptomatic carotid artery stenosis     CVA (cerebral vascular accident) ([facility] [address] )     Diabetes ([facility] [address] )     Diabetic polyneuropathy ([facility] [address] )     Glaucoma     Heart attack ([facility] [address] )     Hyperlipidemia     Hypertension     Hypertrophy of prostate     Memory difficulties     Seborrheic dermatitis     Seborrheic keratosis     TIA (transient ischemic attack)     Vitamin B12 deficiency        Family History:  Family History   Problem Relation Age of Onset    No Known Problems Mother     Diabetes Son     No Known Problems Father        Past Surgical History:  Past Surgical History:   Procedure Laterality Date    IRIDECTOMY      PROSTATE SURGERY      THROMBOENDARTERECTOMY         Social History:   reports that he has quit smoking  He has never used smokeless tobacco  He reports that he does not drink alcohol or use drugs  Allergies:  Patient has no known allergies        Current Outpatient Prescriptions:     amLODIPine (NORVASC) 10 mg tablet, Take 10 mg by mouth daily  , Disp: , Rfl:     apixaban (ELIQUIS) 5 mg, Take 1 tablet by mouth 2 (two) times a day, Disp: , Rfl:     aspirin (ECOTRIN LOW STRENGTH) 81 mg EC tablet, Take 1 tablet by mouth daily, Disp: , Rfl:     atorvastatin (LIPITOR) 20 mg tablet, Take 1 tablet by mouth daily, Disp: , Rfl:     carvedilol (COREG) 25 mg tablet, , Disp: , Rfl:     Coenzyme Q10 (COQ-10) 100 MG CAPS, Take 1 capsule by mouth daily, Disp: , Rfl:     cyanocobalamin 1,000 mcg/mL, Inject as directed, Disp: , Rfl:     digoxin (LANOXIN) 0 125 mg tablet, Take 1 tablet by mouth daily, Disp: , Rfl:     docusate sodium (COLACE) 100 mg capsule, Take 100 mg by mouth daily as needed for constipation, Disp: , Rfl:     donepezil (ARICEPT) 10 mg tablet, TAKE ONE TABLET BY MOUTH AT BEDTIME, Disp: 30 tablet, Rfl: 5    donepezil (ARICEPT) 5 mg tablet, Take 10 mg by mouth daily at bedtime  , Disp: , Rfl:     doxazosin (CARDURA) 4 mg tablet, Take 1 tablet by mouth 2 (two) times a day, Disp: , Rfl:     gabapentin (NEURONTIN) 300 mg capsule, Take 600 mg by mouth 2 (two) times a day  , Disp: , Rfl:     gemfibrozil (LOPID) 600 mg tablet, Take 1 tablet by mouth 2 (two) times a day, Disp: , Rfl:     glipiZIDE (GLUCOTROL XL) 2 5 mg 24 hr tablet, Take by mouth, Disp: , Rfl:     isosorbide mononitrate (IMDUR) 30 mg 24 hr tablet, Take 1 tablet by mouth daily, Disp: , Rfl:     ketoconazole (NIZORAL) 2 % shampoo, Apply 1 application topically 2 (two) times a week, Disp: 120 mL, Rfl: 2    losartan (COZAAR) 100 MG tablet, , Disp: , Rfl:     metFORMIN (GLUCOPHAGE) 500 mg tablet, Take 1 tablet by mouth 2 (two) times a day, Disp: , Rfl:     ondansetron (ZOFRAN) 4 mg tablet, TAKE 1 TAB BY MOUTH THREE TO FOUR TIMES PER DAY AS NEEDED, Disp: , Rfl: 0    polyethylene glycol (MIRALAX) 17 g packet, Take by mouth Daily, Disp: , Rfl:     I have reviewed the past medical, social and family history, current medications, allergies, vitals, review of systems and updated this information as appropriate today     Objective:    Vitals:  Blood pressure 160/64, pulse (!) 53, height 5' 7 5" (1 715 m), weight 69 4 kg (153 lb)  Physical Exam    Neurological Exam  GENERAL:  Well-developed well-nourished man in no acute distress  HEENT/NECK: Head is atraumatic normocephalic, neck is supple  CARDIOVASCULAR:  No Carotid bruit  NEUROLOGIC:  Mental Status: Awake and alert without aphasia  He scored a 16+ 1/30 on [place]  Cranial Nerves: Extraocular movements are full  Face is symmetrical  Motor:   No drift is noted on arm extension  Coordination:   Finger-to-nose testing is performed accurately  Romberg reveals sway with eyes closed  Gait is stable            ROS:    Review of Systems   Constitutional: Negative  Negative for appetite change and fever  HENT: Negative  Negative for hearing loss, tinnitus, trouble swallowing and voice change  Eyes: Negative  Negative for photophobia and pain  Respiratory: Negative  Negative for shortness of breath  Cardiovascular: Negative  Negative for palpitations  Gastrointestinal: Negative  Negative for nausea and vomiting  Endocrine: Negative  Negative for cold intolerance and heat intolerance  Genitourinary: Negative  Negative for dysuria, frequency and urgency  Musculoskeletal: Positive for back pain and gait problem   Negative for myalgias and neck pain  Skin: Negative  Negative for rash  Neurological: Positive for light-headedness and numbness  Negative for dizziness, tremors, seizures, syncope, facial asymmetry, speech difficulty, weakness and headaches  Hematological: Negative  Does not bruise/bleed easily  Psychiatric/Behavioral: Positive for confusion and sleep disturbance  Negative for hallucinations  All other systems reviewed and are negative

## 2018-10-04 ENCOUNTER — DOCUMENTATION (OUTPATIENT)
Dept: NEUROLOGY | Facility: CLINIC | Age: 78
End: 2018-10-04

## 2018-10-09 ENCOUNTER — TELEPHONE (OUTPATIENT)
Dept: NEUROLOGY | Facility: CLINIC | Age: 78
End: 2018-10-09

## 2018-10-09 DIAGNOSIS — F02.80 ALZHEIMER'S DEMENTIA WITHOUT BEHAVIORAL DISTURBANCE, UNSPECIFIED TIMING OF DEMENTIA ONSET (HCC): Primary | ICD-10-CM

## 2018-10-09 DIAGNOSIS — G30.9 ALZHEIMER'S DEMENTIA WITHOUT BEHAVIORAL DISTURBANCE, UNSPECIFIED TIMING OF DEMENTIA ONSET (HCC): Primary | ICD-10-CM

## 2018-10-09 RX ORDER — MEMANTINE HYDROCHLORIDE 5 MG-10 MG
KIT ORAL
Qty: 49 TABLET | Refills: 0 | Status: SHIPPED | OUTPATIENT
Start: 2018-10-09 | End: 2019-04-11 | Stop reason: ALTCHOICE

## 2018-10-09 RX ORDER — DONEPEZIL HYDROCHLORIDE 10 MG/1
10 TABLET, FILM COATED ORAL
Qty: 30 TABLET | Refills: 5 | Status: SHIPPED | OUTPATIENT
Start: 2018-10-09 | End: 2019-03-26 | Stop reason: SDUPTHER

## 2018-10-09 RX ORDER — MEMANTINE HYDROCHLORIDE 10 MG/1
10 TABLET ORAL 2 TIMES DAILY
Qty: 60 TABLET | Refills: 5 | Status: SHIPPED | OUTPATIENT
Start: 2018-10-09

## 2018-10-09 NOTE — TELEPHONE ENCOUNTER
namzaric denied  Pt must tried and fail generic memantine  See denial under media dated 10/5  please advise

## 2018-10-12 ENCOUNTER — TELEPHONE (OUTPATIENT)
Dept: NEUROLOGY | Facility: CLINIC | Age: 78
End: 2018-10-12

## 2018-10-16 ENCOUNTER — HOSPITAL ENCOUNTER (OUTPATIENT)
Dept: ULTRASOUND IMAGING | Facility: CLINIC | Age: 78
Discharge: HOME | End: 2018-10-16
Payer: COMMERCIAL

## 2018-10-16 DIAGNOSIS — I63.431 CEREBROVASCULAR ACCIDENT (CVA) DUE TO EMBOLISM OF RIGHT POSTERIOR CEREBRAL ARTERY (HCC): ICD-10-CM

## 2018-10-16 PROCEDURE — 93880 EXTRACRANIAL BILAT STUDY: CPT | Performed by: SURGERY

## 2018-10-16 PROCEDURE — 93880 EXTRACRANIAL BILAT STUDY: CPT

## 2019-03-26 DIAGNOSIS — G30.9 ALZHEIMER'S DEMENTIA WITHOUT BEHAVIORAL DISTURBANCE, UNSPECIFIED TIMING OF DEMENTIA ONSET (HCC): ICD-10-CM

## 2019-03-26 DIAGNOSIS — F02.80 ALZHEIMER'S DEMENTIA WITHOUT BEHAVIORAL DISTURBANCE, UNSPECIFIED TIMING OF DEMENTIA ONSET (HCC): ICD-10-CM

## 2019-03-26 RX ORDER — DONEPEZIL HYDROCHLORIDE 10 MG/1
10 TABLET, FILM COATED ORAL
Qty: 30 TABLET | Refills: 5 | Status: SHIPPED | OUTPATIENT
Start: 2019-03-26 | End: 2019-04-11 | Stop reason: SDUPTHER

## 2019-04-11 ENCOUNTER — OFFICE VISIT (OUTPATIENT)
Dept: NEUROLOGY | Facility: CLINIC | Age: 79
End: 2019-04-11

## 2019-04-11 VITALS
DIASTOLIC BLOOD PRESSURE: 56 MMHG | SYSTOLIC BLOOD PRESSURE: 138 MMHG | HEART RATE: 60 BPM | HEIGHT: 68 IN | WEIGHT: 148.4 LBS | BODY MASS INDEX: 22.49 KG/M2

## 2019-04-11 DIAGNOSIS — G30.9 ALZHEIMER'S DEMENTIA WITHOUT BEHAVIORAL DISTURBANCE, UNSPECIFIED TIMING OF DEMENTIA ONSET (HCC): ICD-10-CM

## 2019-04-11 DIAGNOSIS — C34.90 LUNG CANCER METASTATIC TO BRAIN (HCC): Primary | ICD-10-CM

## 2019-04-11 DIAGNOSIS — F02.80 ALZHEIMER'S DEMENTIA WITHOUT BEHAVIORAL DISTURBANCE, UNSPECIFIED TIMING OF DEMENTIA ONSET (HCC): ICD-10-CM

## 2019-04-11 DIAGNOSIS — I63.512 CEREBROVASCULAR ACCIDENT (CVA) DUE TO OCCLUSION OF LEFT MIDDLE CEREBRAL ARTERY (HCC): ICD-10-CM

## 2019-04-11 DIAGNOSIS — C79.31 LUNG CANCER METASTATIC TO BRAIN (HCC): Primary | ICD-10-CM

## 2019-04-11 DIAGNOSIS — I63.431 CEREBROVASCULAR ACCIDENT (CVA) DUE TO EMBOLISM OF RIGHT POSTERIOR CEREBRAL ARTERY (HCC): ICD-10-CM

## 2019-04-11 DIAGNOSIS — E13.42 POLYNEUROPATHY DUE TO SECONDARY DIABETES (HCC): ICD-10-CM

## 2019-04-11 DIAGNOSIS — F01.50 VASCULAR DEMENTIA WITHOUT BEHAVIORAL DISTURBANCE (HCC): ICD-10-CM

## 2019-04-11 PROCEDURE — 99214 OFFICE O/P EST MOD 30 MIN: CPT | Performed by: PSYCHIATRY & NEUROLOGY

## 2019-04-11 RX ORDER — MELOXICAM 7.5 MG/1
1 TABLET ORAL 2 TIMES DAILY
Refills: 0 | COMMUNITY
Start: 2019-03-27

## 2019-04-11 RX ORDER — DONEPEZIL HYDROCHLORIDE 10 MG/1
10 TABLET, FILM COATED ORAL
Qty: 30 TABLET | Refills: 5 | Status: SHIPPED | OUTPATIENT
Start: 2019-04-11

## 2019-04-11 RX ORDER — DEXAMETHASONE 4 MG/1
1 TABLET ORAL 4 TIMES DAILY
COMMUNITY

## 2019-04-11 RX ORDER — INSULIN ASPART 100 [IU]/ML
100 INJECTION, SOLUTION INTRAVENOUS; SUBCUTANEOUS 3 TIMES DAILY
Refills: 0 | COMMUNITY
Start: 2019-03-28

## 2019-04-30 ENCOUNTER — OFFICE VISIT (OUTPATIENT)
Dept: DERMATOLOGY | Facility: CLINIC | Age: 79
End: 2019-04-30
Payer: COMMERCIAL

## 2019-04-30 DIAGNOSIS — L98.9 UNKNOWN SKIN LESION: Primary | ICD-10-CM

## 2019-04-30 DIAGNOSIS — Z13.89 SCREENING FOR SKIN CONDITION: ICD-10-CM

## 2019-04-30 PROCEDURE — 88305 TISSUE EXAM BY PATHOLOGIST: CPT | Performed by: PATHOLOGY

## 2019-04-30 PROCEDURE — 11104 PUNCH BX SKIN SINGLE LESION: CPT | Performed by: DERMATOLOGY

## 2019-04-30 PROCEDURE — 99203 OFFICE O/P NEW LOW 30 MIN: CPT | Performed by: DERMATOLOGY

## 2019-04-30 PROCEDURE — 11105 PUNCH BX SKIN EA SEP/ADDL: CPT | Performed by: DERMATOLOGY

## 2019-05-22 ENCOUNTER — TELEPHONE (OUTPATIENT)
Dept: DERMATOLOGY | Facility: CLINIC | Age: 79
End: 2019-05-22